# Patient Record
Sex: MALE | Race: WHITE | NOT HISPANIC OR LATINO
[De-identification: names, ages, dates, MRNs, and addresses within clinical notes are randomized per-mention and may not be internally consistent; named-entity substitution may affect disease eponyms.]

---

## 2017-01-02 ENCOUNTER — FORM ENCOUNTER (OUTPATIENT)
Age: 67
End: 2017-01-02

## 2017-02-05 ENCOUNTER — FORM ENCOUNTER (OUTPATIENT)
Age: 67
End: 2017-02-05

## 2017-02-06 ENCOUNTER — FORM ENCOUNTER (OUTPATIENT)
Age: 67
End: 2017-02-06

## 2017-10-29 ENCOUNTER — FORM ENCOUNTER (OUTPATIENT)
Age: 67
End: 2017-10-29

## 2018-02-08 ENCOUNTER — FORM ENCOUNTER (OUTPATIENT)
Age: 68
End: 2018-02-08

## 2022-12-07 DIAGNOSIS — Z86.79 PERSONAL HISTORY OF OTHER DISEASES OF THE CIRCULATORY SYSTEM: ICD-10-CM

## 2022-12-07 DIAGNOSIS — Z82.49 FAMILY HISTORY OF ISCHEMIC HEART DISEASE AND OTHER DISEASES OF THE CIRCULATORY SYSTEM: ICD-10-CM

## 2022-12-07 DIAGNOSIS — E10.42 TYPE 1 DIABETES MELLITUS WITH DIABETIC POLYNEUROPATHY: ICD-10-CM

## 2022-12-07 DIAGNOSIS — Z86.19 PERSONAL HISTORY OF OTHER INFECTIOUS AND PARASITIC DISEASES: ICD-10-CM

## 2022-12-07 DIAGNOSIS — I10 ESSENTIAL (PRIMARY) HYPERTENSION: ICD-10-CM

## 2022-12-07 DIAGNOSIS — M20.42 OTHER HAMMER TOE(S) (ACQUIRED), LEFT FOOT: ICD-10-CM

## 2022-12-07 DIAGNOSIS — M20.41 OTHER HAMMER TOE(S) (ACQUIRED), RIGHT FOOT: ICD-10-CM

## 2022-12-07 PROBLEM — Z00.00 ENCOUNTER FOR PREVENTIVE HEALTH EXAMINATION: Status: ACTIVE | Noted: 2022-12-07

## 2022-12-07 RX ORDER — APIXABAN 2.5 MG/1
2.5 TABLET, FILM COATED ORAL
Refills: 0 | Status: ACTIVE | COMMUNITY

## 2022-12-07 RX ORDER — GLIMEPIRIDE 4 MG/1
4 TABLET ORAL
Refills: 0 | Status: ACTIVE | COMMUNITY

## 2022-12-07 RX ORDER — PIOGLITAZONE HYDROCHLORIDE 30 MG/1
30 TABLET ORAL
Refills: 0 | Status: ACTIVE | COMMUNITY

## 2022-12-07 RX ORDER — DICLOFENAC SODIUM 10 MG/G
1 GEL TOPICAL
Refills: 0 | Status: ACTIVE | COMMUNITY

## 2022-12-07 RX ORDER — ATORVASTATIN CALCIUM 80 MG/1
80 TABLET, FILM COATED ORAL
Refills: 0 | Status: ACTIVE | COMMUNITY

## 2022-12-07 RX ORDER — INSULIN GLARGINE 100 [IU]/ML
100 INJECTION, SOLUTION SUBCUTANEOUS
Refills: 0 | Status: ACTIVE | COMMUNITY

## 2022-12-07 RX ORDER — EMPAGLIFLOZIN 10 MG/1
10 TABLET, FILM COATED ORAL
Refills: 0 | Status: ACTIVE | COMMUNITY

## 2022-12-07 RX ORDER — INSULIN LISPRO 100 [IU]/ML
100 INJECTION, SOLUTION INTRAVENOUS; SUBCUTANEOUS
Refills: 0 | Status: ACTIVE | COMMUNITY

## 2022-12-07 RX ORDER — RAMIPRIL 10 MG/1
10 CAPSULE ORAL
Refills: 0 | Status: ACTIVE | COMMUNITY

## 2022-12-20 ENCOUNTER — APPOINTMENT (OUTPATIENT)
Dept: PODIATRY | Facility: CLINIC | Age: 72
End: 2022-12-20
Payer: MEDICARE

## 2022-12-20 PROCEDURE — 99212 OFFICE O/P EST SF 10 MIN: CPT

## 2022-12-23 NOTE — HISTORY OF PRESENT ILLNESS
[Sneakers] : sonya [FreeTextEntry1] : Patient presents today with a Charcot foot that was treated in the past. He has osteoarthropathy but it is a non-active Charcot and stable with no sign of breakdown. He has right hallux onychomycotic nails.  Today his fasting blood sugar was 79. \par

## 2022-12-23 NOTE — PHYSICAL EXAM
[1+] : left foot posterior tibialis 1+ [2+] : left foot dorsalis pedis 2+ [Diminished Throughout Right Foot] : diminished sensation with monofilament testing throughout right foot [Diminished Throughout Left Foot] : diminished sensation with monofilament testing throughout left foot [Delayed in the Right Toes] : capillary refills normal in right toes [Delayed in the Left Toes] : capillary refills normal in the left toes [de-identified] : Non-active Charcot. Osteoarthropathy. Rocker-bottom foot. No sign of ulceration. [FreeTextEntry4] : absent vibratory  [FreeTextEntry8] : absent vibratory  [FreeTextEntry1] : [place]-Vane monofilament testing absent at the hallux, 1st MPJ and heel bilateral.

## 2022-12-23 NOTE — ASSESSMENT
[FreeTextEntry1] : \par Impression: Diabetes with neuropathy. Onychomycosis. Charcot foot.\par \par Treatment: Continue orthopedic shoes and well padded comfortable extra width shoes. I debrided mycotic nails. I discussed peripheral neuropathy and that he is a fall risk. Follow-up in the office for evaluation in 3 months.

## 2022-12-27 ENCOUNTER — NON-APPOINTMENT (OUTPATIENT)
Age: 72
End: 2022-12-27

## 2023-03-21 ENCOUNTER — APPOINTMENT (OUTPATIENT)
Dept: PODIATRY | Facility: CLINIC | Age: 73
End: 2023-03-21
Payer: MEDICARE

## 2023-03-21 PROCEDURE — 99212 OFFICE O/P EST SF 10 MIN: CPT

## 2023-03-23 NOTE — PHYSICAL EXAM
[1+] : left foot posterior tibialis 1+ [2+] : left foot dorsalis pedis 2+ [Diminished Throughout Right Foot] : diminished sensation with monofilament testing throughout right foot [Diminished Throughout Left Foot] : diminished sensation with monofilament testing throughout left foot [Delayed in the Right Toes] : capillary refills normal in right toes [Delayed in the Left Toes] : capillary refills normal in the left toes [de-identified] : Charcot foot, non-active rocker-bottom right foot. Progressing hammertoes especially at #2 but there is no breakdown. The skin is supple. It is stable. No flare-up. [FreeTextEntry4] : absent vibratory  [FreeTextEntry8] : absent vibratory  [FreeTextEntry1] : [place]-Vane monofilament testing absent at the hallux, 1st MPJ and heel bilateral.

## 2023-03-23 NOTE — HISTORY OF PRESENT ILLNESS
[Sneakers] : sonya [FreeTextEntry1] : Patient presents today for reevaluation. He has Charcot foot and adult-acquired flatfoot with progressing hammertoes. His A1c is 7.1. Fasting sugar is 116. He last saw Dr. Mccarthy 3 months ago. \par

## 2023-03-23 NOTE — ASSESSMENT
[FreeTextEntry1] : \par Impression: Diabetic Charcot foot. Flatfoot.\par \par Treatment: I debrided the mycotic nail. Regarding the Charcot I checked his orthotics and discussed how to take care of it. Continue the orthopedic shoe gear. Inspect the foot daily. Follow-up in the office in 3 months.

## 2023-06-27 ENCOUNTER — APPOINTMENT (OUTPATIENT)
Dept: PODIATRY | Facility: CLINIC | Age: 73
End: 2023-06-27
Payer: MEDICARE

## 2023-06-27 PROCEDURE — 11055 PARING/CUTG B9 HYPRKER LES 1: CPT

## 2023-06-27 PROCEDURE — 99212 OFFICE O/P EST SF 10 MIN: CPT | Mod: 25

## 2023-06-29 NOTE — ASSESSMENT
[FreeTextEntry1] : \par Impression: Charcot foot. Hammertoe. Onychomycosis.\par \par Treatment: First of all the right hallux I trimmed the keratotic lesion on this patient whose foot is at risk because of neuropathy. I gave him tube foam. I want him to use Aquaphor. Regarding the Charcot and hammertoes I want him to get new Plastazote orthotics to use daily and inspect the foot. his shoes are adequate. I want him taking B12 and follow-up in the office with any problems that persist.

## 2023-06-29 NOTE — PHYSICAL EXAM
[1+] : left foot posterior tibialis 1+ [2+] : left foot dorsalis pedis 2+ [Diminished Throughout Right Foot] : diminished sensation with monofilament testing throughout right foot [Diminished Throughout Left Foot] : diminished sensation with monofilament testing throughout left foot [Delayed in the Right Toes] : capillary refills normal in right toes [Delayed in the Left Toes] : capillary refills normal in the left toes [de-identified] : Stable Charcot foot on the right side, non-active with rocker-bottom flatfoot and semi-rigid arthritic hammertoes 1 and 2 on the right. Hallux mycotic nails bilateral. [FreeTextEntry4] : absent vibratory  [FreeTextEntry8] : absent vibratory  [FreeTextEntry1] : [place]-Vane monofilament testing absent at the hallux, 1st MPJ and heel bilateral.

## 2023-06-29 NOTE — HISTORY OF PRESENT ILLNESS
[Sneakers] : sonya [FreeTextEntry1] : Patient presents today for multiple issues. He has Charcot foot on the right side that is stable, non-active. He has progressing hammertoe especially at the right hallux that is causing him to get a keratosis.   He has onychomycotic nails. A1c is 7. Fasting sugar is 104. He has been using Plastazote orthotics but they are sort of flattening out.

## 2023-09-26 ENCOUNTER — APPOINTMENT (OUTPATIENT)
Dept: PODIATRY | Facility: CLINIC | Age: 73
End: 2023-09-26
Payer: MEDICARE

## 2023-09-26 DIAGNOSIS — E11.610 TYPE 2 DIABETES MELLITUS WITH DIABETIC NEUROPATHIC ARTHROPATHY: ICD-10-CM

## 2023-09-26 DIAGNOSIS — E11.49 TYPE 2 DIABETES MELLITUS WITH OTHER DIABETIC NEUROLOGICAL COMPLICATION: ICD-10-CM

## 2023-09-26 PROCEDURE — 99212 OFFICE O/P EST SF 10 MIN: CPT

## 2023-09-29 PROBLEM — E11.610 DIABETIC CHARCOT FOOT: Status: ACTIVE | Noted: 2022-12-07

## 2023-09-29 PROBLEM — E11.49 DM (DIABETES MELLITUS), TYPE 2 WITH NEUROLOGICAL COMPLICATIONS: Status: ACTIVE | Noted: 2022-12-21

## 2024-01-03 ENCOUNTER — APPOINTMENT (OUTPATIENT)
Dept: PODIATRY | Facility: CLINIC | Age: 74
End: 2024-01-03
Payer: MEDICARE

## 2024-01-03 DIAGNOSIS — M21.40 FLAT FOOT [PES PLANUS] (ACQUIRED), UNSPECIFIED FOOT: ICD-10-CM

## 2024-01-03 PROCEDURE — 99212 OFFICE O/P EST SF 10 MIN: CPT

## 2024-01-04 PROBLEM — M21.40 FLAT FOOT [PES PLANUS] (ACQUIRED), UNSPECIFIED FOOT: Status: ACTIVE | Noted: 2023-03-22

## 2024-01-05 NOTE — PHYSICAL EXAM
[1+] : left foot posterior tibialis 1+ [2+] : left foot dorsalis pedis 2+ [Diminished Throughout Right Foot] : diminished sensation with monofilament testing throughout right foot [Diminished Throughout Left Foot] : diminished sensation with monofilament testing throughout left foot [Delayed in the Right Toes] : capillary refills normal in right toes [Delayed in the Left Toes] : capillary refills normal in the left toes [de-identified] : There is no arthritis at the 1st MPJ on the left and I think the pain is nerve mediated. On the hallux and 2nd toe on the right, the hammertoes are end-stage deformed and contracted but there is no keratosis or breakdown noted. They are semi-rigid to rigid. [FreeTextEntry4] : absent vibratory  [FreeTextEntry8] : absent vibratory  [FreeTextEntry1] : [place]-Vane monofilament testing absent at the hallux, 1st MPJ and heel bilateral.

## 2024-01-05 NOTE — HISTORY OF PRESENT ILLNESS
[FreeTextEntry1] : Patient presents for multiple issues. He has peripheral neuropathy and history of Charcot foot on the right side. He complains of pain at the left 1st MPJ, hallux mycotic nails and hammertoes that are progressing at the 1st and 2nd toe on the right.

## 2024-01-05 NOTE — ASSESSMENT
[FreeTextEntry1] : Impression: Hammertoe. Flatfoot. Neuralgia.  Treatment: The Charcot is stable. The hammertoes are non-irritated. He is asking about surgery. I am not recommending it. I debrided mycotic nails. I checked his orthotics. I recommended changing them yearly. Shoes are orthopedic and adequate. I discussed that he is a fall risk. I debrided mycotic nails. He will follow-up in the office as needed.

## 2024-04-04 ENCOUNTER — APPOINTMENT (OUTPATIENT)
Dept: PODIATRY | Facility: CLINIC | Age: 74
End: 2024-04-04
Payer: MEDICARE

## 2024-04-04 DIAGNOSIS — M79.2 NEURALGIA AND NEURITIS, UNSPECIFIED: ICD-10-CM

## 2024-04-04 DIAGNOSIS — M20.40 OTHER HAMMER TOE(S) (ACQUIRED), UNSPECIFIED FOOT: ICD-10-CM

## 2024-04-04 DIAGNOSIS — B35.1 TINEA UNGUIUM: ICD-10-CM

## 2024-04-04 PROCEDURE — 11720 DEBRIDE NAIL 1-5: CPT

## 2024-04-05 PROBLEM — M20.40 HAMMERTOE: Status: ACTIVE | Noted: 2023-06-28

## 2024-04-05 PROBLEM — M79.2 NEURALGIA: Status: ACTIVE | Noted: 2024-01-04

## 2024-04-05 PROBLEM — B35.1 ONYCHOMYCOSIS: Status: ACTIVE | Noted: 2022-12-21

## 2024-04-08 NOTE — ASSESSMENT
[FreeTextEntry1] : Impression: Onychomycosis. Pain. Neuralgia. Hammertoes.  Treatment: I debrided and debulked mycotic nails with a small straight nail splitter and rotary scott, so he is more comfortable in shoe gear and wears shoe gear without this becoming a problem. Slight irritation on the left hallux. I put a Neosporin dressing on. He will inspect the feet daily. Call the office with any issues whatsoever. I will see him back for routine foot care in 3 months.

## 2024-04-08 NOTE — HISTORY OF PRESENT ILLNESS
[FreeTextEntry1] : Patient presents today for evaluation of end-stage deformed hallux mycotic nails. They are yellow, thick, brittle with subungual debris. There is periungual erythema, inflammation and pain despite having neuropathy.

## 2024-04-08 NOTE — PHYSICAL EXAM
[1+] : left foot posterior tibialis 1+ [2+] : left foot dorsalis pedis 2+ [Diminished Throughout Right Foot] : diminished sensation with monofilament testing throughout right foot [Diminished Throughout Left Foot] : diminished sensation with monofilament testing throughout left foot [Delayed in the Right Toes] : capillary refills normal in right toes [Delayed in the Left Toes] : capillary refills normal in the left toes [de-identified] : Non-active right side Charcot foot. Arthritic hammertoes bilateral. [FreeTextEntry4] : absent vibratory  [FreeTextEntry8] : absent vibratory  [FreeTextEntry1] : [place]-Vane monofilament testing absent at the hallux, 1st MPJ and heel bilateral.

## 2024-07-11 ENCOUNTER — APPOINTMENT (OUTPATIENT)
Dept: PODIATRY | Facility: CLINIC | Age: 74
End: 2024-07-11

## 2024-07-24 ENCOUNTER — APPOINTMENT (OUTPATIENT)
Dept: PODIATRY | Facility: CLINIC | Age: 74
End: 2024-07-24

## 2024-07-24 DIAGNOSIS — M79.676 PAIN IN UNSPECIFIED TOE(S): ICD-10-CM

## 2024-07-24 DIAGNOSIS — M20.40 OTHER HAMMER TOE(S) (ACQUIRED), UNSPECIFIED FOOT: ICD-10-CM

## 2024-07-24 DIAGNOSIS — B35.1 TINEA UNGUIUM: ICD-10-CM

## 2024-07-24 DIAGNOSIS — E11.49 TYPE 2 DIABETES MELLITUS WITH OTHER DIABETIC NEUROLOGICAL COMPLICATION: ICD-10-CM

## 2024-07-24 PROCEDURE — 99212 OFFICE O/P EST SF 10 MIN: CPT

## 2024-07-30 PROBLEM — M79.676 TOE PAIN: Status: ACTIVE | Noted: 2024-07-29

## 2024-07-30 NOTE — PHYSICAL EXAM
[1+] : left foot posterior tibialis 1+ [2+] : left foot dorsalis pedis 2+ [Diminished Throughout Right Foot] : diminished sensation with monofilament testing throughout right foot [Diminished Throughout Left Foot] : diminished sensation with monofilament testing throughout left foot [Delayed in the Right Toes] : capillary refills normal in right toes [Delayed in the Left Toes] : capillary refills normal in the left toes [de-identified] : Non-active right side Charcot foot.  He has severely contracted 1st and 2nd hammertoes of the right foot.  [FreeTextEntry4] : absent vibratory  [FreeTextEntry8] : absent vibratory  [FreeTextEntry1] : [place]-Vane monofilament testing absent at the hallux, 1st MPJ and heel bilateral.

## 2024-07-30 NOTE — ASSESSMENT
[FreeTextEntry1] : Impression: Onychomycosis. Pain. Diabetes with neuropathy. Hammertoes.  Treatment: I debrided and debulked mycotic nails with a small straight nail splitter and rotary scott, so he is more comfortable in shoe gear and wears shoe gear without this becoming a problem. I applied Nafin gel.  He will continue to live with the hammertoes and work on stretching and appropriate shoe gear. He will inspect the feet daily. With continued pain that persists we could consider more aggressive treatment, but at this point I am recommending holding off. Follow-up for routine foot care.

## 2024-07-30 NOTE — PHYSICAL EXAM
[1+] : left foot posterior tibialis 1+ [2+] : left foot dorsalis pedis 2+ [Diminished Throughout Right Foot] : diminished sensation with monofilament testing throughout right foot [Diminished Throughout Left Foot] : diminished sensation with monofilament testing throughout left foot [Delayed in the Right Toes] : capillary refills normal in right toes [Delayed in the Left Toes] : capillary refills normal in the left toes [de-identified] : Non-active right side Charcot foot.  He has severely contracted 1st and 2nd hammertoes of the right foot.  [FreeTextEntry4] : absent vibratory  [FreeTextEntry8] : absent vibratory  [FreeTextEntry1] : [place]-Vane monofilament testing absent at the hallux, 1st MPJ and heel bilateral.

## 2024-07-30 NOTE — HISTORY OF PRESENT ILLNESS
[FreeTextEntry1] : Patient presents today for multiple issues. He is S/P total knee replacement on the right side. His A1c is 6.9. Fasting sugar is 81. He is asking about hammertoe surgery of the right 1st and 2nd toes that are contracted. He has onychomycotic hallux nails.

## 2024-07-30 NOTE — PHYSICAL EXAM
[1+] : left foot posterior tibialis 1+ [2+] : left foot dorsalis pedis 2+ [Diminished Throughout Right Foot] : diminished sensation with monofilament testing throughout right foot [Diminished Throughout Left Foot] : diminished sensation with monofilament testing throughout left foot [Delayed in the Right Toes] : capillary refills normal in right toes [Delayed in the Left Toes] : capillary refills normal in the left toes [de-identified] : Non-active right side Charcot foot.  He has severely contracted 1st and 2nd hammertoes of the right foot.  [FreeTextEntry4] : absent vibratory  [FreeTextEntry8] : absent vibratory  [FreeTextEntry1] : [place]-Vane monofilament testing absent at the hallux, 1st MPJ and heel bilateral.

## 2024-10-02 ENCOUNTER — APPOINTMENT (OUTPATIENT)
Dept: PODIATRY | Facility: CLINIC | Age: 74
End: 2024-10-02

## 2024-10-02 DIAGNOSIS — E11.610 TYPE 2 DIABETES MELLITUS WITH DIABETIC NEUROPATHIC ARTHROPATHY: ICD-10-CM

## 2024-10-02 DIAGNOSIS — B35.1 TINEA UNGUIUM: ICD-10-CM

## 2024-10-02 PROCEDURE — 99212 OFFICE O/P EST SF 10 MIN: CPT

## 2024-10-09 NOTE — HISTORY OF PRESENT ILLNESS
[FreeTextEntry1] : Patient present today for evaluation and care of Charcot foot on the right side. He has a progressing hammered hallux. He is S/P knee replacement on the right side, and he is wearing his orthopedic shoes.

## 2024-10-09 NOTE — PHYSICAL EXAM
[1+] : left foot posterior tibialis 1+ [2+] : left foot dorsalis pedis 2+ [Diminished Throughout Right Foot] : diminished sensation with monofilament testing throughout right foot [Diminished Throughout Left Foot] : diminished sensation with monofilament testing throughout left foot [Delayed in the Right Toes] : capillary refills normal in right toes [Delayed in the Left Toes] : capillary refills normal in the left toes [de-identified] : He has hammered right hallux with severe flatfoot with rocker-bottom, but no breakdown or ulcer. It is a non-active Charcot foot with osteoarthropathy. He also has hallux mycotic nails.  [FreeTextEntry4] : absent vibratory  [FreeTextEntry8] : absent vibratory  [FreeTextEntry1] : [place]-Vane monofilament testing absent at the hallux, 1st MPJ and heel bilateral.

## 2024-10-09 NOTE — ASSESSMENT
[FreeTextEntry1] : Impression: Charcot foot. Hallux onychomycosis.  Treatment: I manually and mechanically debrided mycotic nails using a small straight nail splitter and rotary scott. The nails were aggressively debrided and debulked to make them comfortable in shoe gear.  Regarding the Charcot foot, I discussed appropriate shoe gear. I told him what to avoid and he will follow-up for evaluation as needed.

## 2024-10-09 NOTE — PHYSICAL EXAM
[1+] : left foot posterior tibialis 1+ [2+] : left foot dorsalis pedis 2+ [Diminished Throughout Right Foot] : diminished sensation with monofilament testing throughout right foot [Diminished Throughout Left Foot] : diminished sensation with monofilament testing throughout left foot [Delayed in the Right Toes] : capillary refills normal in right toes [Delayed in the Left Toes] : capillary refills normal in the left toes [de-identified] : He has hammered right hallux with severe flatfoot with rocker-bottom, but no breakdown or ulcer. It is a non-active Charcot foot with osteoarthropathy. He also has hallux mycotic nails.  [FreeTextEntry4] : absent vibratory  [FreeTextEntry8] : absent vibratory  [FreeTextEntry1] : [place]-Vane monofilament testing absent at the hallux, 1st MPJ and heel bilateral.

## 2024-10-09 NOTE — PHYSICAL EXAM
[1+] : left foot posterior tibialis 1+ [2+] : left foot dorsalis pedis 2+ [Diminished Throughout Right Foot] : diminished sensation with monofilament testing throughout right foot [Diminished Throughout Left Foot] : diminished sensation with monofilament testing throughout left foot [Delayed in the Right Toes] : capillary refills normal in right toes [Delayed in the Left Toes] : capillary refills normal in the left toes [de-identified] : He has hammered right hallux with severe flatfoot with rocker-bottom, but no breakdown or ulcer. It is a non-active Charcot foot with osteoarthropathy. He also has hallux mycotic nails.  [FreeTextEntry4] : absent vibratory  [FreeTextEntry8] : absent vibratory  [FreeTextEntry1] : [place]-Vane monofilament testing absent at the hallux, 1st MPJ and heel bilateral.

## 2025-01-03 ENCOUNTER — APPOINTMENT (OUTPATIENT)
Dept: PODIATRY | Facility: CLINIC | Age: 75
End: 2025-01-03

## 2025-01-03 DIAGNOSIS — Z91.81 HISTORY OF FALLING: ICD-10-CM

## 2025-01-03 DIAGNOSIS — E11.610 TYPE 2 DIABETES MELLITUS WITH DIABETIC NEUROPATHIC ARTHROPATHY: ICD-10-CM

## 2025-01-03 DIAGNOSIS — M20.40 OTHER HAMMER TOE(S) (ACQUIRED), UNSPECIFIED FOOT: ICD-10-CM

## 2025-01-03 DIAGNOSIS — E11.49 TYPE 2 DIABETES MELLITUS WITH OTHER DIABETIC NEUROLOGICAL COMPLICATION: ICD-10-CM

## 2025-01-03 PROCEDURE — 99212 OFFICE O/P EST SF 10 MIN: CPT

## 2025-01-07 PROBLEM — Z91.81 RISK FOR FALLS: Status: ACTIVE | Noted: 2025-01-06

## 2025-04-04 ENCOUNTER — APPOINTMENT (OUTPATIENT)
Dept: PODIATRY | Facility: CLINIC | Age: 75
End: 2025-04-04

## 2025-04-04 DIAGNOSIS — E11.610 TYPE 2 DIABETES MELLITUS WITH DIABETIC NEUROPATHIC ARTHROPATHY: ICD-10-CM

## 2025-04-04 PROCEDURE — 99212 OFFICE O/P EST SF 10 MIN: CPT

## 2025-07-08 ENCOUNTER — APPOINTMENT (OUTPATIENT)
Dept: PODIATRY | Facility: CLINIC | Age: 75
End: 2025-07-08